# Patient Record
Sex: FEMALE | Race: WHITE | NOT HISPANIC OR LATINO | Employment: PART TIME | URBAN - METROPOLITAN AREA
[De-identification: names, ages, dates, MRNs, and addresses within clinical notes are randomized per-mention and may not be internally consistent; named-entity substitution may affect disease eponyms.]

---

## 2017-02-22 ENCOUNTER — TELEPHONE (OUTPATIENT)
Dept: CARDIOLOGY | Facility: MEDICAL CENTER | Age: 62
End: 2017-02-22

## 2017-02-23 NOTE — TELEPHONE ENCOUNTER
Patient informed.  She will have her surgeon fax a clearance request when she sees him at her next FV.

## 2017-02-23 NOTE — TELEPHONE ENCOUNTER
----- Message from Shelly Werner M.D. sent at 2/22/2017  4:39 PM PST -----  Regarding: RE: permission for foot surgery  Contact: [phone]  May proceed  Moderate risk  Avoid stopping antiplatelet if possible    ----- Message -----     From: Angeles Smith R.N.     Sent: 2/22/2017   4:24 PM       To: Shelly Werner M.D.  Subject: FW: permission for foot surgery                      ----- Message -----     From: Peggy Thorne     Sent: 2/22/2017   4:06 PM       To: Angeles Smith R.N.  Subject: permission for foot surgery                      CR/radha Vincent calling for CR's permission for bunion surgery to be done asap.  Please call pt [phone]

## 2017-02-28 ENCOUNTER — TELEPHONE (OUTPATIENT)
Dept: CARDIOLOGY | Facility: MEDICAL CENTER | Age: 62
End: 2017-02-28

## 2017-02-28 NOTE — TELEPHONE ENCOUNTER
Call received from Dr. WENDI Brewster's office stating they can't schedule surgery until they see the patient, she has not been there for a year.  Patient notified of this and that she needs to schedule a FV with them.

## 2017-02-28 NOTE — TELEPHONE ENCOUNTER
----- Message from Peggy Thorne sent at 2/28/2017  9:48 AM PST -----  Regarding: cardiac clearance  Contact: [phone]  SIS/radha    Pt calling for cardiac clearance so foot surgery can be scheduled.  Pt's foot doctor is Dr Brewster at [facility] in [facility] ([phone]) told pt to call our practice for this.      Please call Samia [phone] for details.

## 2017-05-19 ENCOUNTER — TELEPHONE (OUTPATIENT)
Dept: CARDIOLOGY | Facility: MEDICAL CENTER | Age: 62
End: 2017-05-19

## 2017-05-19 NOTE — TELEPHONE ENCOUNTER
Unable to contact patient by phone to check symptoms.  This is noted in clearance letter.  Letter faxed as requested.

## 2017-05-19 NOTE — TELEPHONE ENCOUNTER
----- Message from Shelly Werner M.D. sent at 5/19/2017  3:27 PM PDT -----  Regarding: RE: Doctor's office is needing clearance letter faxed   OK if no recent cardiac symptoms    ----- Message -----     From: Angeles Smith R.N.     Sent: 5/19/2017   1:39 PM       To: Shelly Werner M.D.  Subject: FW: Doctor's office is needing clearance let#    You had cleared this patient for this surgery in Feb, moderate risk and to avoid stopping Plavix and aspirin.  Is this still good, she is now planning the surgery?  Thanks.  ----- Message -----     From: Ricardo Moraes     Sent: 5/19/2017  12:08 PM       To: Angeles Smith R.N.  Subject: Doctor's office is needing clearance letter Boris ALEGRE/Hui Causey at [phone] with Dr Brewster office at [facility] in [place], [place] is calling. She is needing learance letter faxed to [phone] as pt foot surgery is scheduled for 5/22. Any question's she can be reached at # above.

## 2017-05-30 ENCOUNTER — OFFICE VISIT (OUTPATIENT)
Dept: CARDIOLOGY | Facility: CLINIC | Age: 62
End: 2017-05-30

## 2017-05-30 VITALS
BODY MASS INDEX: 31.89 KG/M2 | SYSTOLIC BLOOD PRESSURE: 130 MMHG | WEIGHT: 180 LBS | HEIGHT: 63 IN | DIASTOLIC BLOOD PRESSURE: 76 MMHG | HEART RATE: 65 BPM

## 2017-05-30 DIAGNOSIS — R74.01 TRANSAMINITIS: ICD-10-CM

## 2017-05-30 DIAGNOSIS — I21.19: ICD-10-CM

## 2017-05-30 DIAGNOSIS — I25.10 CORONARY ARTERY DISEASE INVOLVING NATIVE CORONARY ARTERY OF NATIVE HEART WITHOUT ANGINA PECTORIS: ICD-10-CM

## 2017-05-30 DIAGNOSIS — E78.2 MIXED HYPERLIPIDEMIA: ICD-10-CM

## 2017-05-30 PROCEDURE — 99214 OFFICE O/P EST MOD 30 MIN: CPT | Performed by: INTERNAL MEDICINE

## 2017-05-30 ASSESSMENT — ENCOUNTER SYMPTOMS
RESPIRATORY NEGATIVE: 1
DIZZINESS: 0
PND: 0
LOSS OF CONSCIOUSNESS: 0
HEMOPTYSIS: 0
BRUISES/BLEEDS EASILY: 0
STRIDOR: 0
EYES NEGATIVE: 1
WEAKNESS: 0
SORE THROAT: 0
WHEEZING: 0
ORTHOPNEA: 0
SPUTUM PRODUCTION: 0
MUSCULOSKELETAL NEGATIVE: 1
CARDIOVASCULAR NEGATIVE: 1
CONSTITUTIONAL NEGATIVE: 1
GASTROINTESTINAL NEGATIVE: 1
COUGH: 0
CLAUDICATION: 0
SHORTNESS OF BREATH: 0
FEVER: 0
PALPITATIONS: 0
CHILLS: 0
NEUROLOGICAL NEGATIVE: 1

## 2017-05-30 NOTE — MR AVS SNAPSHOT
"        Samia Patton   2017 12:20 PM   Office Visit   MRN: [id number]    Department:  [facility]   Dept Phone:  [phone]    Description:  Female : 1955   Provider:  Carl Mustafa M.D.           Reason for Visit     Follow-Up           Allergies as of 2017     Allergen Noted Reactions    Augmentin 2016         You were diagnosed with     Coronary artery disease involving native coronary artery of native heart without angina pectoris   [7789413]       Transaminitis   [299173]       Mixed hyperlipidemia   [272.2.ICD-9-CM]       Myocardial infarction, inferoposterior wall, subsequent care (CMS-HCC)   [268139]         Vital Signs     Blood Pressure Pulse Height Weight Body Mass Index Smoking Status    130/76 mmHg 65 1.6 m (5' 3\") 81.647 kg (180 lb) 31.89 kg/m2 Former Smoker      Basic Information     Date Of Birth Sex Race Ethnicity Preferred Language    1955 Female White Non- English      Your appointments     Dec 12, 2017 12:40 PM   FOLLOW UP with Carl Mustafa M.D.   [facility] ([facility]    [address] [id number] [phone]              Problem List              ICD-10-CM Priority Class Noted - Resolved    Transaminitis R74.0   2016 - Present    CAD (coronary artery disease) I25.10 High  2016 - Present    Myocardial infarction, inferoposterior wall, subsequent care (CMS-HCC) I21.19   2016 - Present    Hyperlipemia E78.5   2016 - Present      Health Maintenance        Date Due Completion Dates    IMM DTaP/Tdap/Td Vaccine (1 - Tdap) 1975 ---    PAP SMEAR 1977 ---    MAMMOGRAM 1996 ---    COLONOSCOPY 2006 ---            Current Immunizations     Influenza Vaccine Adult HD 10/13/2015    Pneumococcal polysaccharide vaccine (PPSV-23) 2016 10:48 AM    SHINGLES VACCINE 3/16/2016      Below and/or attached are the medications your provider expects you " to take. Review all of your home medications and newly ordered medications with your provider and/or pharmacist. Follow medication instructions as directed by your provider and/or pharmacist. Please keep your medication list with you and share with your provider. Update the information when medications are discontinued, doses are changed, or new medications (including over-the-counter products) are added; and carry medication information at all times in the event of emergency situations     Allergies:  AUGMENTIN - (reactions not documented)               Medications  Valid as of: May 31, 2017 -  7:26 AM    Generic Name Brand Name Tablet Size Instructions for use    Aspirin (Tablet Delayed Response) aspirin 81 MG Take 1 Tab by mouth every day.        Atorvastatin Calcium (Tab) LIPITOR 40 MG Take 1 Tab by mouth every bedtime.        Citalopram Hydrobromide (Tab) CELEXA 20 MG Take 20 mg by mouth every day.        Clopidogrel Bisulfate (Tab) PLAVIX 75 MG Take 1 Tab by mouth every day.        Metoprolol Tartrate (Tab) LOPRESSOR 25 MG Take 0.5 Tabs by mouth every 12 hours.        .                 Medicines prescribed today were sent to:     [place] [address] [address]    Phone: [phone] Fax: [phone]    Open 24 Hours?: No      Medication refill instructions:       If your prescription bottle indicates you have medication refills left, it is not necessary to call your provider’s office. Please contact your pharmacy and they will refill your medication.    If your prescription bottle indicates you do not have any refills left, you may request refills at any time through one of the following ways: The online Pocket Video system (except Urgent Care), by calling your provider’s office, or by asking your pharmacy to contact your provider’s office with a refill request. Medication refills are processed only during regular business hours and may not be  available until the next business day. Your provider may request additional information or to have a follow-up visit with you prior to refilling your medication.   *Please Note: Medication refills are assigned a new Rx number when refilled electronically. Your pharmacy may indicate that no refills were authorized even though a new prescription for the same medication is available at the pharmacy. Please request the medicine by name with the pharmacy before contacting your provider for a refill.           PetLove Access Code: [email or web address]  Expires: 6/30/2017  4:37 AM    PetLove  A secure, online tool to manage your health information     [employer]’s PetLove® is a secure, online tool that connects you to your personalized health information from the privacy of your home -- day or night - making it very easy for you to manage your healthcare. Once the activation process is completed, you can even access your medical information using the PetLove rita, which is available for free in the Apple Rita store or Google Play store.     PetLove provides the following levels of access (as shown below):   My Chart Features   [facility] Doctor [facility]  Specialists [facility]  Urgent  Care Non-[facility]  Primary Care  Doctor   Email your healthcare team securely and privately 24/7 X X X    Manage appointments: schedule your next appointment; view details of past/upcoming appointments X      Request prescription refills. X      View recent personal medical records, including lab and immunizations X X X X   View health record, including health history, allergies, medications X X X X   Read reports about your outpatient visits, procedures, consult and ER notes X X X X   See your discharge summary, which is a recap of your hospital and/or ER visit that includes your diagnosis, lab results, and care plan. X X       How to register for PetLove:  1. Go to  [email or web address].  2. Click on the Sign Up Now box, which takes  you to the New Member Sign Up page. You will need to provide the following information:  a. Enter your [email or web address] Access Code exactly as it appears at the top of this page. (You will not need to use this code after you’ve completed the sign-up process. If you do not sign up before the expiration date, you must request a new code.)   b. Enter your date of birth.   c. Enter your home email address.   d. Click Submit, and follow the next screen’s instructions.  3. Create a [employer] ID. This will be your [email or web address] login ID and cannot be changed, so think of one that is secure and easy to remember.  4. Create a [employer] password. You can change your password at any time.  5. Enter your Password Reset Question and Answer. This can be used at a later time if you forget your password.   6. Enter your e-mail address. This allows you to receive e-mail notifications when new information is available in [email or web address].  7. Click Sign Up. You can now view your health information.    For assistance activating your [employer] account, call [phone]

## 2017-05-30 NOTE — Clinical Note
[facility]   [address] [place] [address]  Phone: [phone]  Fax:                Samia Patton  1955    Encounter Date: 5/30/2017    Carl Mustafa M.D.          PROGRESS NOTE:  Subjective:   Samia Patton is a 62 y.o. female who presents today as a follow up for her CAD s/p stent to her RCA.  She is doing well with no functional limitations.  She is complaining of generalized fatigue but otherwise doing well.  She had surgery on a bunion and bone spur in her foot and is recovering.    Past Medical History   Diagnosis Date   • Myocardial infarct (CMS-HCC) 6/25/2016   • Depression      Past Surgical History   Procedure Laterality Date   • Gyn surgery       hysterectomy   • Inguinal hernia repair     • Cardiac cath  6/25/16     Xience stent to 95% Circ     History reviewed. No pertinent family history.  History   Smoking status   • Former Smoker -- 1.00 packs/day for 33 years   • Types: Cigarettes   • Start date: 06/25/1976   • Quit date: 06/25/2009   Smokeless tobacco   • Never Used     Allergies   Allergen Reactions   • Augmentin      Outpatient Encounter Prescriptions as of 5/30/2017   Medication Sig Dispense Refill   • metoprolol (LOPRESSOR) 25 MG Tab Take 0.5 Tabs by mouth every 12 hours. 30 Tab 11   • clopidogrel (PLAVIX) 75 MG Tab Take 1 Tab by mouth every day. 30 Tab 11   • citalopram (CELEXA) 20 MG Tab Take 20 mg by mouth every day.     • atorvastatin (LIPITOR) 40 MG Tab Take 1 Tab by mouth every bedtime. 30 Tab 11   • aspirin EC 81 MG EC tablet Take 1 Tab by mouth every day. 30 Tab 2     No facility-administered encounter medications on file as of 5/30/2017.     Review of Systems   Constitutional: Negative.  Negative for fever, chills and malaise/fatigue.   HENT: Negative.  Negative for sore throat.    Eyes: Negative.    Respiratory: Negative.  Negative for cough, hemoptysis, sputum production, shortness of breath, wheezing  "and stridor.    Cardiovascular: Negative.  Negative for chest pain, palpitations, orthopnea, claudication, leg swelling and PND.   Gastrointestinal: Negative.    Genitourinary: Negative.    Musculoskeletal: Negative.    Skin: Negative.    Neurological: Negative.  Negative for dizziness, loss of consciousness and weakness.   Endo/Heme/Allergies: Negative.  Does not bruise/bleed easily.   All other systems reviewed and are negative.       Objective:   /76 mmHg  Pulse 65  Ht 1.6 m (5' 3\")  Wt 81.647 kg (180 lb)  BMI 31.89 kg/m2    Physical Exam   Constitutional: She is oriented to person, place, and time. She appears well-developed and well-nourished. No distress.   HENT:   Head: Normocephalic.   Mouth/Throat: Oropharynx is clear and moist.   Eyes: EOM are normal. Pupils are equal, round, and reactive to light. Right eye exhibits no discharge. Left eye exhibits no discharge. No scleral icterus.   Neck: Normal range of motion. Neck supple. No JVD present. No tracheal deviation present.   Cardiovascular: Normal rate, regular rhythm, S1 normal, S2 normal, normal heart sounds, intact distal pulses and normal pulses.  Exam reveals no gallop, no S3, no S4 and no friction rub.    No murmur heard.   No systolic murmur is present    No diastolic murmur is present   Pulses:       Carotid pulses are 2+ on the right side, and 2+ on the left side.       Radial pulses are 2+ on the right side, and 2+ on the left side.        Dorsalis pedis pulses are 2+ on the right side, and 2+ on the left side.        Posterior tibial pulses are 2+ on the right side, and 2+ on the left side.   Pulmonary/Chest: Effort normal and breath sounds normal. No respiratory distress. She has no wheezes. She has no rales.   Abdominal: Soft. Bowel sounds are normal. She exhibits no distension and no mass. There is no tenderness. There is no rebound and no guarding.   Musculoskeletal: She exhibits no edema.   Neurological: She is alert and oriented " to person, place, and time. No cranial nerve deficit.   Skin: Skin is warm and dry. She is not diaphoretic. No pallor.   Psychiatric: She has a normal mood and affect. Her behavior is normal. Judgment and thought content normal.   Nursing note and vitals reviewed.      Assessment:     1. Coronary artery disease involving native coronary artery of native heart without angina pectoris     2. Transaminitis     3. Mixed hyperlipidemia     4. Myocardial infarction, inferoposterior wall, subsequent care (CMS-[facility])         Medical Decision Making:  Today's Assessment / Status / Plan:     63 y/o F with CAD s/p STEMI to her RCA on DAPT now for one year.  She can stop her clopidogrel at the end of next month.  We will see her back in 6 months with lipids prior.    Thank for you allowing me to take part in your patient's care, please call should you have any questions or would like to discuss this patient.        Shelly Werner M.D.  [address]  [address]  [place] [address]  VIA In Basket

## 2017-05-30 NOTE — PROGRESS NOTES
Subjective:   Samia Patton is a 62 y.o. female who presents today as a follow up for her CAD s/p stent to her RCA.  She is doing well with no functional limitations.  She is complaining of generalized fatigue but otherwise doing well.  She had surgery on a bunion and bone spur in her foot and is recovering.    Past Medical History   Diagnosis Date   • Myocardial infarct (CMS-HCC) 6/25/2016   • Depression      Past Surgical History   Procedure Laterality Date   • Gyn surgery       hysterectomy   • Inguinal hernia repair     • Cardiac cath  6/25/16     Xience stent to 95% Circ     History reviewed. No pertinent family history.  History   Smoking status   • Former Smoker -- 1.00 packs/day for 33 years   • Types: Cigarettes   • Start date: 06/25/1976   • Quit date: 06/25/2009   Smokeless tobacco   • Never Used     Allergies   Allergen Reactions   • Augmentin      Outpatient Encounter Prescriptions as of 5/30/2017   Medication Sig Dispense Refill   • metoprolol (LOPRESSOR) 25 MG Tab Take 0.5 Tabs by mouth every 12 hours. 30 Tab 11   • clopidogrel (PLAVIX) 75 MG Tab Take 1 Tab by mouth every day. 30 Tab 11   • citalopram (CELEXA) 20 MG Tab Take 20 mg by mouth every day.     • atorvastatin (LIPITOR) 40 MG Tab Take 1 Tab by mouth every bedtime. 30 Tab 11   • aspirin EC 81 MG EC tablet Take 1 Tab by mouth every day. 30 Tab 2     No facility-administered encounter medications on file as of 5/30/2017.     Review of Systems   Constitutional: Negative.  Negative for fever, chills and malaise/fatigue.   HENT: Negative.  Negative for sore throat.    Eyes: Negative.    Respiratory: Negative.  Negative for cough, hemoptysis, sputum production, shortness of breath, wheezing and stridor.    Cardiovascular: Negative.  Negative for chest pain, palpitations, orthopnea, claudication, leg swelling and PND.   Gastrointestinal: Negative.    Genitourinary: Negative.    Musculoskeletal: Negative.    Skin: Negative.    Neurological: Negative.   "Negative for dizziness, loss of consciousness and weakness.   Endo/Heme/Allergies: Negative.  Does not bruise/bleed easily.   All other systems reviewed and are negative.       Objective:   /76 mmHg  Pulse 65  Ht 1.6 m (5' 3\")  Wt 81.647 kg (180 lb)  BMI 31.89 kg/m2    Physical Exam   Constitutional: She is oriented to person, place, and time. She appears well-developed and well-nourished. No distress.   HENT:   Head: Normocephalic.   Mouth/Throat: Oropharynx is clear and moist.   Eyes: EOM are normal. Pupils are equal, round, and reactive to light. Right eye exhibits no discharge. Left eye exhibits no discharge. No scleral icterus.   Neck: Normal range of motion. Neck supple. No JVD present. No tracheal deviation present.   Cardiovascular: Normal rate, regular rhythm, S1 normal, S2 normal, normal heart sounds, intact distal pulses and normal pulses.  Exam reveals no gallop, no S3, no S4 and no friction rub.    No murmur heard.   No systolic murmur is present    No diastolic murmur is present   Pulses:       Carotid pulses are 2+ on the right side, and 2+ on the left side.       Radial pulses are 2+ on the right side, and 2+ on the left side.        Dorsalis pedis pulses are 2+ on the right side, and 2+ on the left side.        Posterior tibial pulses are 2+ on the right side, and 2+ on the left side.   Pulmonary/Chest: Effort normal and breath sounds normal. No respiratory distress. She has no wheezes. She has no rales.   Abdominal: Soft. Bowel sounds are normal. She exhibits no distension and no mass. There is no tenderness. There is no rebound and no guarding.   Musculoskeletal: She exhibits no edema.   Neurological: She is alert and oriented to person, place, and time. No cranial nerve deficit.   Skin: Skin is warm and dry. She is not diaphoretic. No pallor.   Psychiatric: She has a normal mood and affect. Her behavior is normal. Judgment and thought content normal.   Nursing note and vitals " reviewed.      Assessment:     1. Coronary artery disease involving native coronary artery of native heart without angina pectoris     2. Transaminitis     3. Mixed hyperlipidemia     4. Myocardial infarction, inferoposterior wall, subsequent care (CMS-[facility])         Medical Decision Making:  Today's Assessment / Status / Plan:     63 y/o F with CAD s/p STEMI to her RCA on DAPT now for one year.  She can stop her clopidogrel at the end of next month.  We will see her back in 6 months with lipids prior.    Thank for you allowing me to take part in your patient's care, please call should you have any questions or would like to discuss this patient.

## 2017-07-26 DIAGNOSIS — E78.2 MIXED HYPERLIPIDEMIA: ICD-10-CM

## 2017-07-26 RX ORDER — ATORVASTATIN CALCIUM 40 MG/1
TABLET, FILM COATED ORAL
Qty: 30 TAB | Refills: 11 | Status: SHIPPED | OUTPATIENT
Start: 2017-07-26 | End: 2019-11-01

## 2017-09-22 DIAGNOSIS — E78.49 OTHER HYPERLIPIDEMIA: ICD-10-CM

## 2017-09-22 DIAGNOSIS — I21.19: ICD-10-CM

## 2017-09-22 DIAGNOSIS — R74.01 TRANSAMINITIS: ICD-10-CM

## 2017-09-26 ENCOUNTER — OFFICE VISIT (OUTPATIENT)
Dept: CARDIOLOGY | Facility: CLINIC | Age: 62
End: 2017-09-26

## 2017-09-26 VITALS
SYSTOLIC BLOOD PRESSURE: 130 MMHG | HEIGHT: 63 IN | DIASTOLIC BLOOD PRESSURE: 70 MMHG | HEART RATE: 66 BPM | WEIGHT: 180 LBS | BODY MASS INDEX: 31.89 KG/M2

## 2017-09-26 DIAGNOSIS — I21.19: ICD-10-CM

## 2017-09-26 DIAGNOSIS — R74.01 TRANSAMINITIS: ICD-10-CM

## 2017-09-26 DIAGNOSIS — E78.2 MIXED HYPERLIPIDEMIA: ICD-10-CM

## 2017-09-26 DIAGNOSIS — I25.10 CORONARY ARTERY DISEASE INVOLVING NATIVE CORONARY ARTERY OF NATIVE HEART WITHOUT ANGINA PECTORIS: ICD-10-CM

## 2017-09-26 PROCEDURE — 99214 OFFICE O/P EST MOD 30 MIN: CPT | Performed by: INTERNAL MEDICINE

## 2017-09-26 ASSESSMENT — ENCOUNTER SYMPTOMS
FEVER: 0
BRUISES/BLEEDS EASILY: 0
NEUROLOGICAL NEGATIVE: 1
MUSCULOSKELETAL NEGATIVE: 1
RESPIRATORY NEGATIVE: 1
DIZZINESS: 0
SHORTNESS OF BREATH: 0
COUGH: 0
PALPITATIONS: 0
EYES NEGATIVE: 1
ORTHOPNEA: 0
WHEEZING: 0
GASTROINTESTINAL NEGATIVE: 1
WEAKNESS: 0
STRIDOR: 0
SORE THROAT: 0
LOSS OF CONSCIOUSNESS: 0
PND: 0
HEMOPTYSIS: 0
CLAUDICATION: 0
CONSTITUTIONAL NEGATIVE: 1
CARDIOVASCULAR NEGATIVE: 1
SPUTUM PRODUCTION: 0
CHILLS: 0

## 2017-10-02 ENCOUNTER — TELEPHONE (OUTPATIENT)
Dept: CARDIOLOGY | Facility: MEDICAL CENTER | Age: 62
End: 2017-10-02

## 2017-10-02 NOTE — TELEPHONE ENCOUNTER
----- Message from Carl Mustafa M.D. sent at 9/29/2017  5:42 PM PDT -----  Please let patient know results look good    ----- Message -----  From: Sydnie Cuadra R.N.  Sent: 9/29/2017   4:17 PM  To: G[address]    BUN 21    Seen 9/26, 9/2018 recall list    Sydnie

## 2019-11-01 ENCOUNTER — OFFICE VISIT (OUTPATIENT)
Dept: CARDIOLOGY | Facility: MEDICAL CENTER | Age: 64
End: 2019-11-01

## 2019-11-01 VITALS
BODY MASS INDEX: 32.6 KG/M2 | HEART RATE: 76 BPM | OXYGEN SATURATION: 96 % | HEIGHT: 63 IN | SYSTOLIC BLOOD PRESSURE: 122 MMHG | WEIGHT: 184 LBS | DIASTOLIC BLOOD PRESSURE: 74 MMHG

## 2019-11-01 DIAGNOSIS — Z79.899 HIGH RISK MEDICATION USE: ICD-10-CM

## 2019-11-01 DIAGNOSIS — E78.2 MIXED HYPERLIPIDEMIA: ICD-10-CM

## 2019-11-01 DIAGNOSIS — G47.19 EXCESSIVE DAYTIME SLEEPINESS: ICD-10-CM

## 2019-11-01 DIAGNOSIS — Z95.5 STENTED CORONARY ARTERY: ICD-10-CM

## 2019-11-01 DIAGNOSIS — R06.09 DYSPNEA ON EXERTION: ICD-10-CM

## 2019-11-01 DIAGNOSIS — I10 HTN (HYPERTENSION), MALIGNANT: ICD-10-CM

## 2019-11-01 DIAGNOSIS — I25.10 CORONARY ARTERY DISEASE INVOLVING NATIVE CORONARY ARTERY OF NATIVE HEART WITHOUT ANGINA PECTORIS: ICD-10-CM

## 2019-11-01 PROCEDURE — 99214 OFFICE O/P EST MOD 30 MIN: CPT | Performed by: INTERNAL MEDICINE

## 2019-11-01 RX ORDER — INFLUENZA A VIRUS A/BRISBANE/02/2018 IVR-190 (H1N1) ANTIGEN (FORMALDEHYDE INACTIVATED), INFLUENZA A VIRUS A/KANSAS/14/2017 X-327 (H3N2) ANTIGEN (FORMALDEHYDE INACTIVATED), INFLUENZA B VIRUS B/PHUKET/3073/2013 ANTIGEN (FORMALDEHYDE INACTIVATED), AND INFLUENZA B VIRUS B/MARYLAND/15/2016 BX-69A ANTIGEN (FORMALDEHYDE INACTIVATED) 15; 15; 15; 15 UG/.5ML; UG/.5ML; UG/.5ML; UG/.5ML
INJECTION, SUSPENSION INTRAMUSCULAR
Refills: 0 | COMMUNITY
Start: 2019-10-21

## 2019-11-01 RX ORDER — CITALOPRAM 40 MG/1
40 TABLET ORAL
Refills: 3 | COMMUNITY
Start: 2019-09-06

## 2019-11-01 RX ORDER — FLUTICASONE PROPIONATE 50 MCG
1 SPRAY, SUSPENSION (ML) NASAL DAILY
COMMUNITY

## 2019-11-01 RX ORDER — ROSUVASTATIN CALCIUM 40 MG/1
40 TABLET, COATED ORAL DAILY
Qty: 100 TAB | Refills: 3 | Status: SHIPPED | OUTPATIENT
Start: 2019-11-01

## 2019-11-01 ASSESSMENT — ENCOUNTER SYMPTOMS
VOMITING: 0
FALLS: 0
BRUISES/BLEEDS EASILY: 0
CLAUDICATION: 0
BLURRED VISION: 0
COUGH: 0
EYE DISCHARGE: 0
DIZZINESS: 0
SHORTNESS OF BREATH: 0
EYE PAIN: 0
CHILLS: 0
PND: 0
DOUBLE VISION: 0
PALPITATIONS: 0
SENSORY CHANGE: 0
MYALGIAS: 0
BLOOD IN STOOL: 0
FEVER: 0
HEADACHES: 0
ORTHOPNEA: 0
HALLUCINATIONS: 0
ABDOMINAL PAIN: 0
DEPRESSION: 0
LOSS OF CONSCIOUSNESS: 0
NAUSEA: 0
WEIGHT LOSS: 0
SPEECH CHANGE: 0

## 2019-11-01 NOTE — PROGRESS NOTES
Chief Complaint   Patient presents with   • Coronary Artery Disease   • Hyperlipidemia       Subjective:   Samia Patton is a 64 y.o. female who presents today for cardiac care and management due to established coronary to disease status post mid left circumflex artery stent in June 2016.  At that time, patient presented with acute inferior posterior wall ST elevation MI.  She received TNKase but had residual disease at the mid left circumflex artery.    I personally interpreted the blood test results which showed elevated LDL of 103, elevated triglycerides of 159.    Patient now reports of having low energy level, does not feel like she wants to do things during the day.  Waking up not feeling well.    Past Medical History:   Diagnosis Date   • Depression    • Myocardial infarct (HCC) 6/25/2016     Past Surgical History:   Procedure Laterality Date   • ZZZ CARDIAC CATH  6/25/16    Xience stent to 95% Circ   • GYN SURGERY      hysterectomy   • INGUINAL HERNIA REPAIR       History reviewed. No pertinent family history.  Social History     Socioeconomic History   • Marital status:      Spouse name: Not on file   • Number of children: Not on file   • Years of education: Not on file   • Highest education level: Not on file   Occupational History   • Not on file   Social Needs   • Financial resource strain: Not on file   • Food insecurity:     Worry: Not on file     Inability: Not on file   • Transportation needs:     Medical: Not on file     Non-medical: Not on file   Tobacco Use   • Smoking status: Former Smoker     Packs/day: 1.00     Years: 33.00     Pack years: 33.00     Types: Cigarettes     Start date: 6/25/1976     Last attempt to quit: 6/25/2009     Years since quitting: 10.3   • Smokeless tobacco: Never Used   Substance and Sexual Activity   • Alcohol use: Yes   • Drug use: No   • Sexual activity: Not on file   Lifestyle   • Physical activity:     Days per week: Not on file     Minutes per session: Not on  file   • Stress: Not on file   Relationships   • Social connections:     Talks on phone: Not on file     Gets together: Not on file     Attends Sabianist service: Not on file     Active member of club or organization: Not on file     Attends meetings of clubs or organizations: Not on file     Relationship status: Not on file   • Intimate partner violence:     Fear of current or ex partner: Not on file     Emotionally abused: Not on file     Physically abused: Not on file     Forced sexual activity: Not on file   Other Topics Concern   • Not on file   Social History Narrative   • Not on file     Allergies   Allergen Reactions   • Augmentin      Outpatient Encounter Medications as of 11/1/2019   Medication Sig Dispense Refill   • citalopram (CELEXA) 40 MG Tab Take 40 mg by mouth.  3   • fluticasone (FLONASE) 50 MCG/ACT nasal spray Spray 1 Spray in nose every day.     • vitamin D (CHOLECALCIFEROL) 1000 UNIT Tab Take 1,000 Units by mouth every day.     • rosuvastatin (CRESTOR) 40 MG tablet Take 1 Tab by mouth every day. 100 Tab 3   • estradiol (ESTRACE) 1 MG Tab Take 1 mg by mouth every day.     • ibuprofen (MOTRIN) 400 MG Tab Take 400 mg by mouth every 6 hours as needed.     • aspirin EC 81 MG EC tablet Take 1 Tab by mouth every day. 30 Tab 2   • FLUZONE QUADRIVALENT 0.5 ML Suspension Prefilled Syringe injection   0   • [DISCONTINUED] atorvastatin (LIPITOR) 40 MG Tab TAKE ONE TABLET BY MOUTH ONCE DAILY AT BEDTIME 30 Tab 11   • citalopram (CELEXA) 20 MG Tab Take 20 mg by mouth every day.       No facility-administered encounter medications on file as of 11/1/2019.      Review of Systems   Constitutional: Negative for chills, fever, malaise/fatigue and weight loss.   HENT: Negative for ear discharge, ear pain, hearing loss and nosebleeds.    Eyes: Negative for blurred vision, double vision, pain and discharge.   Respiratory: Negative for cough and shortness of breath.    Cardiovascular: Negative for chest pain,  "palpitations, orthopnea, claudication, leg swelling and PND.   Gastrointestinal: Negative for abdominal pain, blood in stool, melena, nausea and vomiting.   Genitourinary: Negative for dysuria and hematuria.   Musculoskeletal: Negative for falls, joint pain and myalgias.   Skin: Negative for itching and rash.   Neurological: Negative for dizziness, sensory change, speech change, loss of consciousness and headaches.   Endo/Heme/Allergies: Negative for environmental allergies. Does not bruise/bleed easily.   Psychiatric/Behavioral: Negative for depression, hallucinations and suicidal ideas.        Objective:   /74 (BP Location: Left arm, Patient Position: Sitting, BP Cuff Size: Adult)   Pulse 76   Ht 1.6 m (5' 3\")   Wt 83.5 kg (184 lb)   SpO2 96%   BMI 32.59 kg/m²     Physical Exam   Constitutional: She is oriented to person, place, and time. No distress.   HENT:   Head: Normocephalic and atraumatic.   Right Ear: External ear normal.   Left Ear: External ear normal.   Eyes: Right eye exhibits no discharge. Left eye exhibits no discharge.   Neck: No JVD present. No thyromegaly present.   Cardiovascular: Normal rate and regular rhythm. Exam reveals no gallop and no friction rub.   Murmur heard.  Pulmonary/Chest: Breath sounds normal. No respiratory distress.   Abdominal: Bowel sounds are normal. She exhibits no distension. There is no tenderness.   Musculoskeletal: She exhibits no edema or tenderness.   Neurological: She is alert and oriented to person, place, and time. No cranial nerve deficit.   Skin: Skin is warm and dry. She is not diaphoretic.   Psychiatric: She has a normal mood and affect. Her behavior is normal.   Nursing note and vitals reviewed.      Assessment:     1. Stented coronary artery  EC-ECHOCARDIOGRAM COMPLETE W/O CONT   2. Coronary artery disease involving native coronary artery of native heart without angina pectoris  EC-ECHOCARDIOGRAM COMPLETE W/O CONT   3. HTN (hypertension), malignant "  REFERRAL TO SLEEP STUDIES   4. Mixed hyperlipidemia     5. High risk medication use     6. Excessive daytime sleepiness  REFERRAL TO SLEEP STUDIES   7. Dyspnea on exertion  EC-ECHOCARDIOGRAM COMPLETE W/O CONT       Medical Decision Making:  Today's Assessment / Status / Plan:   Coronary arterial disease s/p Lcx stent in 06/2016:  At this time, it is uncertain why patient is not on ACE inhibitor or beta blockers.  Based on her current symptomatology of fatigue and malaise, I strongly suspect underlying obstructive sleep apnea.  I will send patient to have sleep studies done.  In the meantime, I will hold off on beta-blockers along with ACE inhibitor until sleep studies are done.    ASA 81 mg po daily.    At this time we will switch a atorvastatin to rosuvastatin 40 mg p.o. twice once a day per    I will order transthoracic echocardiogram to assess for structural abnormalities.       Hypertension:  Blood pressure is well controlled.     Hyperlipidemia:  Optimize statin as within guidelines of CAD treatment as above.    Overall, based on prior history of obstructive coronary arterial disease, patient is at at high risk for recurrent events and will require consistent ongoing guidelines directed medical therapy to reduce his cardiovascular mortality and associated complications.    I will see patient back in clinic with lab tests and studies results in 6 months.    I thank you for referring patient to our Cardiology Clinic today.      Ruby Tam MD.   [facility].  [phone]  [place], [place].